# Patient Record
(demographics unavailable — no encounter records)

---

## 2025-04-21 NOTE — HISTORY OF PRESENT ILLNESS
[FreeTextEntry1] : Interval History: Pt presents today for f/u. Endorses feeling well. Denies recurrence of transient speech difficulty.   HPI:  Ms. Urbina is an 86-year-old woman with PMH of thyroid cancer s/p total thyroidectomy and memory loss who presents today, accompanied by daughter, for hospital follow-up for TIA. Initially presented to the ED on 7/19 for a transient episode of aphasia lasting for a few minutes while she was at her swim club. Neurological workup was negative for acute infarct.   Since discharge, she feels back to her baseline. Reports compliance with medications, tolerating well w/o issue. Per daughter, PCP started her on Zetia a few days prior to event. She has had atorvastatin intolerance in the past with severe muscle weakness. Never tried low intensity statin. Never smoker. Denies family history of stroke.  Denies any new weakness, numbness, speech difficulty, or vision changes

## 2025-04-21 NOTE — DISCUSSION/SUMMARY
[FreeTextEntry1] : Ms. Urbina is an 87-year-old woman with PMH of thyroid cancer s/p total thyroidectomy and memory loss who presents for follow-up.  # TIA: transient aphasia on 7/19/24. MRI brain without ischemic stroke, CTH revealed a focal hypodensity in the left caudate head, CTA with severe stenosis of distal right V4 segment and severe stenosis of right P2 segment. MRI showed some diffuse dural thickening along the bilateral cerebral/cerebellar convexities and along the dorsal clivus and petrous bones. NIHSS: 0 mRS: 0. LDL: 109. F.u MRI brain with stable dural thickening. MRA head with stable ICAD.  - Continue ASA 81 mg daily - Continue Ezetimibe 10mg daily  Pt amenable to trial of low dose, low intensity statin. Start simvastatin 50mg daily - LDL goal <70  - Lipid profile, CMP, in 3 mos.   RTC in 4 mo.

## 2025-04-21 NOTE — PHYSICAL EXAM
[General Appearance - Alert] : alert [General Appearance - In No Acute Distress] : in no acute distress [General Appearance - Well Nourished] : well nourished [General Appearance - Well Developed] : well developed [Oriented To Time, Place, And Person] : oriented to person, place, and time [Affect] : the affect was normal [Person] : oriented to person [Place] : oriented to place [Time] : oriented to time [Naming Objects] : no difficulty naming common objects [Fluency] : fluency intact [Comprehension] : comprehension intact [Cranial Nerves Optic (II)] : visual acuity intact bilaterally,  visual fields full to confrontation, pupils equal round and reactive to light [Cranial Nerves Oculomotor (III)] : extraocular motion intact [Cranial Nerves Trigeminal (V)] : facial sensation intact symmetrically [Cranial Nerves Facial (VII)] : face symmetrical [Cranial Nerves Vestibulocochlear (VIII)] : hearing was intact bilaterally [Cranial Nerves Hypoglossal (XII)] : there was no tongue deviation with protrusion [Motor Tone] : muscle tone was normal in all four extremities [Motor Strength] : muscle strength was normal in all four extremities [Sensation Tactile Decrease] : light touch was intact [Coordination - Dysmetria Impaired Finger-to-Nose Bilateral] : not present